# Patient Record
Sex: MALE | Race: WHITE | NOT HISPANIC OR LATINO | Employment: UNEMPLOYED | RURAL
[De-identification: names, ages, dates, MRNs, and addresses within clinical notes are randomized per-mention and may not be internally consistent; named-entity substitution may affect disease eponyms.]

---

## 2021-10-26 ENCOUNTER — HOSPITAL ENCOUNTER (EMERGENCY)
Facility: HOSPITAL | Age: 61
Discharge: HOME | End: 2021-10-26
Payer: MEDICARE

## 2021-10-26 VITALS
OXYGEN SATURATION: 95 % | SYSTOLIC BLOOD PRESSURE: 153 MMHG | TEMPERATURE: 98 F | WEIGHT: 261 LBS | DIASTOLIC BLOOD PRESSURE: 86 MMHG | RESPIRATION RATE: 18 BRPM | HEART RATE: 76 BPM | HEIGHT: 68 IN | BODY MASS INDEX: 39.56 KG/M2

## 2021-10-26 DIAGNOSIS — S39.011A ABDOMINAL WALL STRAIN, INITIAL ENCOUNTER: Primary | ICD-10-CM

## 2021-10-26 DIAGNOSIS — R10.9 ABDOMINAL PAIN: ICD-10-CM

## 2021-10-26 LAB
ALBUMIN SERPL BCP-MCNC: 3.8 G/DL (ref 3.5–5)
ALBUMIN/GLOB SERPL: 0.9 {RATIO}
ALP SERPL-CCNC: 64 U/L (ref 45–115)
ALT SERPL W P-5'-P-CCNC: 90 U/L (ref 16–61)
ANION GAP SERPL CALCULATED.3IONS-SCNC: 12 MMOL/L (ref 7–16)
AST SERPL W P-5'-P-CCNC: 57 U/L (ref 15–37)
BASOPHILS # BLD AUTO: 0.1 K/UL (ref 0–0.2)
BASOPHILS NFR BLD AUTO: 1.1 % (ref 0–1)
BILIRUB SERPL-MCNC: 0.5 MG/DL (ref 0–1.2)
BILIRUB UR QL STRIP: NEGATIVE
BUN SERPL-MCNC: 14 MG/DL (ref 7–18)
BUN/CREAT SERPL: 14 (ref 6–20)
CALCIUM SERPL-MCNC: 8.6 MG/DL (ref 8.5–10.1)
CHLORIDE SERPL-SCNC: 102 MMOL/L (ref 98–107)
CLARITY UR: CLEAR
CO2 SERPL-SCNC: 29 MMOL/L (ref 21–32)
COLOR UR: ABNORMAL
CREAT SERPL-MCNC: 1.03 MG/DL (ref 0.7–1.3)
DIFFERENTIAL METHOD BLD: ABNORMAL
EOSINOPHIL # BLD AUTO: 0.4 K/UL (ref 0–0.5)
EOSINOPHIL NFR BLD AUTO: 4.4 % (ref 1–4)
ERYTHROCYTE [DISTWIDTH] IN BLOOD BY AUTOMATED COUNT: 12.7 % (ref 11.5–14.5)
GLOBULIN SER-MCNC: 4.1 G/DL (ref 2–4)
GLUCOSE SERPL-MCNC: 113 MG/DL (ref 74–106)
GLUCOSE UR STRIP-MCNC: NEGATIVE MG/DL
HCT VFR BLD AUTO: 43.3 % (ref 40–54)
HGB BLD-MCNC: 15.2 G/DL (ref 13.5–18)
IMM GRANULOCYTES # BLD AUTO: 0.03 K/UL (ref 0–0.04)
IMM GRANULOCYTES NFR BLD: 0.3 % (ref 0–0.4)
KETONES UR STRIP-SCNC: NEGATIVE MG/DL
LEUKOCYTE ESTERASE UR QL STRIP: NEGATIVE
LYMPHOCYTES # BLD AUTO: 3.57 K/UL (ref 1–4.8)
LYMPHOCYTES NFR BLD AUTO: 38.8 % (ref 27–41)
MCH RBC QN AUTO: 32.1 PG (ref 27–31)
MCHC RBC AUTO-ENTMCNC: 35.1 G/DL (ref 32–36)
MCV RBC AUTO: 91.4 FL (ref 80–96)
MONOCYTES # BLD AUTO: 0.73 K/UL (ref 0–0.8)
MONOCYTES NFR BLD AUTO: 7.9 % (ref 2–6)
MPC BLD CALC-MCNC: 10.9 FL (ref 9.4–12.4)
NEUTROPHILS # BLD AUTO: 4.36 K/UL (ref 1.8–7.7)
NEUTROPHILS NFR BLD AUTO: 47.5 % (ref 53–65)
NITRITE UR QL STRIP: NEGATIVE
NRBC # BLD AUTO: 0 X10E3/UL
NRBC, AUTO (.00): 0 %
PH UR STRIP: 6 PH UNITS
PLATELET # BLD AUTO: 153 K/UL (ref 150–400)
POTASSIUM SERPL-SCNC: 3.7 MMOL/L (ref 3.5–5.1)
PROT SERPL-MCNC: 7.9 G/DL (ref 6.4–8.2)
PROT UR QL STRIP: NEGATIVE
RBC # BLD AUTO: 4.74 M/UL (ref 4.6–6.2)
RBC # UR STRIP: NEGATIVE /UL
SODIUM SERPL-SCNC: 139 MMOL/L (ref 136–145)
SP GR UR STRIP: >=1.03
UROBILINOGEN UR STRIP-ACNC: 1 MG/DL
WBC # BLD AUTO: 9.19 K/UL (ref 4.5–11)

## 2021-10-26 PROCEDURE — 99283 PR EMERGENCY DEPT VISIT,LEVEL III: ICD-10-PCS | Mod: ,,, | Performed by: NURSE PRACTITIONER

## 2021-10-26 PROCEDURE — 99283 EMERGENCY DEPT VISIT LOW MDM: CPT | Mod: ,,, | Performed by: NURSE PRACTITIONER

## 2021-10-26 PROCEDURE — 84450 TRANSFERASE (AST) (SGOT): CPT | Performed by: NURSE PRACTITIONER

## 2021-10-26 PROCEDURE — 85025 COMPLETE CBC W/AUTO DIFF WBC: CPT | Performed by: NURSE PRACTITIONER

## 2021-10-26 PROCEDURE — 84075 ASSAY ALKALINE PHOSPHATASE: CPT | Performed by: NURSE PRACTITIONER

## 2021-10-26 PROCEDURE — 81003 URINALYSIS AUTO W/O SCOPE: CPT | Performed by: NURSE PRACTITIONER

## 2021-10-26 PROCEDURE — 99285 EMERGENCY DEPT VISIT HI MDM: CPT | Mod: 25

## 2021-10-26 PROCEDURE — 25500020 PHARM REV CODE 255: Performed by: NURSE PRACTITIONER

## 2021-10-26 PROCEDURE — 80053 COMPREHEN METABOLIC PANEL: CPT | Performed by: NURSE PRACTITIONER

## 2021-10-26 PROCEDURE — 36415 COLL VENOUS BLD VENIPUNCTURE: CPT | Performed by: NURSE PRACTITIONER

## 2021-10-26 RX ORDER — GABAPENTIN 300 MG/1
300 CAPSULE ORAL 3 TIMES DAILY
COMMUNITY

## 2021-10-26 RX ORDER — IBUPROFEN 800 MG/1
800 TABLET ORAL 3 TIMES DAILY
Qty: 20 TABLET | Refills: 0 | Status: SHIPPED | OUTPATIENT
Start: 2021-10-26

## 2021-10-26 RX ORDER — HYDROCODONE BITARTRATE AND ACETAMINOPHEN 10; 325 MG/1; MG/1
1 TABLET ORAL EVERY 8 HOURS PRN
COMMUNITY

## 2021-10-26 RX ADMIN — IOPAMIDOL 100 ML: 755 INJECTION, SOLUTION INTRAVENOUS at 09:10

## 2023-12-06 ENCOUNTER — OFFICE VISIT (OUTPATIENT)
Dept: CARDIOLOGY | Facility: CLINIC | Age: 63
End: 2023-12-06
Payer: MEDICARE

## 2023-12-06 VITALS — WEIGHT: 254 LBS | HEIGHT: 68 IN | BODY MASS INDEX: 38.49 KG/M2

## 2023-12-06 DIAGNOSIS — R01.1 NEWLY RECOGNIZED HEART MURMUR: ICD-10-CM

## 2023-12-06 DIAGNOSIS — R06.02 SOB (SHORTNESS OF BREATH): Primary | ICD-10-CM

## 2023-12-06 DIAGNOSIS — I10 ESSENTIAL (PRIMARY) HYPERTENSION: ICD-10-CM

## 2023-12-06 DIAGNOSIS — R01.1 UNDIAGNOSED CARDIAC MURMURS: ICD-10-CM

## 2023-12-06 DIAGNOSIS — I25.118 CORONARY ARTERY DISEASE WITH EXERTIONAL ANGINA: ICD-10-CM

## 2023-12-06 PROCEDURE — 93005 ELECTROCARDIOGRAM TRACING: CPT | Mod: PBBFAC | Performed by: HOSPITALIST

## 2023-12-06 PROCEDURE — 99213 OFFICE O/P EST LOW 20 MIN: CPT | Mod: PBBFAC | Performed by: HOSPITALIST

## 2023-12-06 PROCEDURE — 93010 EKG 12-LEAD: ICD-10-PCS | Mod: S$PBB,,, | Performed by: HOSPITALIST

## 2023-12-06 PROCEDURE — 93010 ELECTROCARDIOGRAM REPORT: CPT | Mod: S$PBB,,, | Performed by: HOSPITALIST

## 2023-12-06 RX ORDER — GABAPENTIN 600 MG/1
600 TABLET ORAL 3 TIMES DAILY
COMMUNITY
Start: 2023-11-24

## 2023-12-06 RX ORDER — TIZANIDINE 4 MG/1
4 TABLET ORAL EVERY 8 HOURS
COMMUNITY
Start: 2023-11-24

## 2023-12-06 RX ORDER — ATORVASTATIN CALCIUM 40 MG/1
40 TABLET, FILM COATED ORAL DAILY
Qty: 30 TABLET | Refills: 5 | Status: SHIPPED | OUTPATIENT
Start: 2023-12-06

## 2023-12-06 RX ORDER — LISINOPRIL AND HYDROCHLOROTHIAZIDE 12.5; 2 MG/1; MG/1
1 TABLET ORAL DAILY
COMMUNITY
Start: 2023-11-21

## 2023-12-06 NOTE — PROGRESS NOTES
"Cardiology Clinic    Referring Provider: Dr. Easley    Subjective:   James Robert is a 63 y.o. male with hx of HTN (recently started on lisinopril/HCTZ by referring), CAD s/p MI at 24yo (no records, patient unsure of details) who presents for eval for as referral from primary for exertional angina and newly recognized murmur.    With regards to angina, patient states he gets winded when walking up inclines and sometimes has to stop to catch his breath. He endorses chest heaviness that is vague, but no overt pain. This has been going on for months, but recently has increased to some degree. It typically resolves within a few minutes of rest.    His murmur was not mentioned in the referral document I saw, but was mentioned in his chart. See physical exam, assessment/plan.     His BP is reasonably well controlled morning despite not taking his anti-htn combo pill this AM. Given such a recent initiation (and seemingly good response), will plan to not uptitrate just yet.     Lastly, given his MI at 24yo, typically would expect a high-intensity statin, but see none on his medication list. See a/p.        Fhx: no family hx of early CAD, somewhat non-contributory given the patient's MI at 24yo  Shx: dips chewing tobacco, works outside mowing grass and doing landscaping    EKG - NSR, non-specific ST-T     ECHO - No results found for this or any previous visit.      CATH - No results found for this or any previous visit.      Stress - None    Lab Results   Component Value Date     10/26/2021    K 3.7 10/26/2021     10/26/2021    CO2 29 10/26/2021    BUN 14 10/26/2021    CREATININE 1.03 10/26/2021    CALCIUM 8.6 10/26/2021    ANIONGAP 12 10/26/2021    EGFRNONAA 78 10/26/2021       No results found for: "CHOL"  No results found for: "HDL"  No results found for: "LDLCALC"  No results found for: "TRIG"  No results found for: "CHOLHDL"    Lab Results   Component Value Date    WBC 9.19 10/26/2021    HGB 15.2 " "10/26/2021    HCT 43.3 10/26/2021    MCV 91.4 10/26/2021     10/26/2021           Current Outpatient Medications:     gabapentin (NEURONTIN) 600 MG tablet, Take 600 mg by mouth 3 (three) times daily., Disp: , Rfl:     HYDROcodone-acetaminophen (NORCO)  mg per tablet, Take 1 tablet by mouth every 8 (eight) hours as needed for Pain., Disp: , Rfl:     ibuprofen (ADVIL,MOTRIN) 800 MG tablet, Take 1 tablet (800 mg total) by mouth 3 (three) times daily., Disp: 20 tablet, Rfl: 0    lisinopriL-hydrochlorothiazide (PRINZIDE,ZESTORETIC) 20-12.5 mg per tablet, Take 1 tablet by mouth once daily., Disp: , Rfl:     tiZANidine (ZANAFLEX) 4 MG tablet, Take 4 mg by mouth every 8 (eight) hours., Disp: , Rfl:     gabapentin (NEURONTIN) 300 MG capsule, Take 300 mg by mouth 3 (three) times daily., Disp: , Rfl:     12-pt Review of System negative aside from as mentioned in HPI, below, and A/P      Objective:   Ht 5' 8" (1.727 m)   Wt 115.2 kg (254 lb)   BMI 38.62 kg/m²       Physical Exam:  Gen:NAD  HEENT: NC,AT  Chest: normal respiratory effort  CV: RRR, 3/6 blowing holosytolic murmur best at the L sternal border  Abd: flat  Ext: no edema  Skin: no visible rash  Neuro: CNGI  Psych: AMAA  : not assessed        Assessment:     Exertional angina in context of CAD w/ MI at very young age  HLD in context of Prior MI - high-intensity statin indicated for secondary prevention  Murmur- newly recognized murmur (patient never told prior),   HTN        Plan:   Exertional angina in context of known CAD and MI at very young age   -further ischemic eval via pharm nuclear stress test given patient cannot walk that well due to L knee/foot issue  2. HLD - empirically start lipitor 40mg daily, check fasting lipid panel  3. Murmur possibly indicative of pathology given that it is holosystolic and does not sound like typical (mild-moderate) AS  -TTE to further evaluate for SHD  4. HTN - continue lisinopril 20/HCTZ 12.5mg PO daily; should " start e.g. coreg 3.125 mg PO BID either from Dr. Easley or at f/u given hx of MI  5. tobacco dependence- patient dips, did not express interest in quitting, though would help with BP control - defer to primary

## 2023-12-26 ENCOUNTER — HOSPITAL ENCOUNTER (OUTPATIENT)
Dept: CARDIOLOGY | Facility: HOSPITAL | Age: 63
Discharge: HOME | End: 2023-12-26
Attending: HOSPITALIST
Payer: MEDICARE

## 2023-12-26 ENCOUNTER — HOSPITAL ENCOUNTER (OUTPATIENT)
Dept: RADIOLOGY | Facility: HOSPITAL | Age: 63
Discharge: HOME | End: 2023-12-26
Attending: HOSPITALIST
Payer: MEDICARE

## 2023-12-26 VITALS — BODY MASS INDEX: 42.32 KG/M2 | HEIGHT: 65 IN | WEIGHT: 254 LBS

## 2023-12-26 DIAGNOSIS — I25.118 CORONARY ARTERY DISEASE WITH EXERTIONAL ANGINA: ICD-10-CM

## 2023-12-26 DIAGNOSIS — R01.1 UNDIAGNOSED CARDIAC MURMURS: ICD-10-CM

## 2023-12-26 DIAGNOSIS — R01.1 NEWLY RECOGNIZED HEART MURMUR: ICD-10-CM

## 2023-12-26 DIAGNOSIS — I10 ESSENTIAL (PRIMARY) HYPERTENSION: ICD-10-CM

## 2023-12-26 DIAGNOSIS — R06.02 SOB (SHORTNESS OF BREATH): ICD-10-CM

## 2023-12-26 PROCEDURE — 78452 HT MUSCLE IMAGE SPECT MULT: CPT | Mod: 26,,, | Performed by: HOSPITALIST

## 2023-12-26 PROCEDURE — 93018 CV STRESS TEST I&R ONLY: CPT | Mod: ,,, | Performed by: HOSPITALIST

## 2023-12-26 PROCEDURE — 93016 CV STRESS TEST SUPVJ ONLY: CPT | Mod: ,,, | Performed by: NURSE PRACTITIONER

## 2023-12-26 PROCEDURE — 93306 TTE W/DOPPLER COMPLETE: CPT

## 2023-12-26 PROCEDURE — 93306 ECHO (CUPID ONLY): ICD-10-PCS | Mod: 26,,, | Performed by: HOSPITALIST

## 2023-12-26 PROCEDURE — A9500 TC99M SESTAMIBI: HCPCS

## 2023-12-26 PROCEDURE — 93017 CV STRESS TEST TRACING ONLY: CPT

## 2023-12-26 PROCEDURE — 93016 NUCLEAR STRESS TEST (CUPID ONLY): ICD-10-PCS | Mod: ,,, | Performed by: NURSE PRACTITIONER

## 2023-12-26 PROCEDURE — 93018 NUCLEAR STRESS TEST (CUPID ONLY): ICD-10-PCS | Mod: ,,, | Performed by: HOSPITALIST

## 2023-12-26 PROCEDURE — 93306 TTE W/DOPPLER COMPLETE: CPT | Mod: 26,,, | Performed by: HOSPITALIST

## 2023-12-26 PROCEDURE — 63600175 PHARM REV CODE 636 W HCPCS: Performed by: HOSPITALIST

## 2023-12-26 PROCEDURE — 78452 NM MYOCARDIAL PERFUSION SPECT MULTI PHARM: ICD-10-PCS | Mod: 26,,, | Performed by: HOSPITALIST

## 2023-12-26 RX ORDER — REGADENOSON 0.08 MG/ML
0.4 INJECTION, SOLUTION INTRAVENOUS ONCE
Status: COMPLETED | OUTPATIENT
Start: 2023-12-26 | End: 2023-12-26

## 2023-12-26 RX ADMIN — REGADENOSON 0.4 MG: 0.08 INJECTION, SOLUTION INTRAVENOUS at 09:12

## 2024-01-01 LAB
AORTIC ROOT ANNULUS: 3.54 CM
AORTIC VALVE CUSP SEPERATION: 1.26 CM
AV INDEX (PROSTH): 0.57
AV MEAN GRADIENT: 7 MMHG
AV PEAK GRADIENT: 14 MMHG
AV REGURGITATION PRESSURE HALF TIME: 935.51 MS
AV VALVE AREA BY VELOCITY RATIO: 1.65 CM²
AV VALVE AREA: 1.86 CM²
AV VELOCITY RATIO: 0.51
BSA FOR ECHO PROCEDURE: 2.3 M2
CV ECHO LV RWT: 0.27 CM
CV STRESS BASE HR: 56 BPM
DIASTOLIC BLOOD PRESSURE: 81 MMHG
DOP CALC AO PEAK VEL: 1.84 M/S
DOP CALC AO VTI: 41.5 CM
DOP CALC LVOT AREA: 3.2 CM2
DOP CALC LVOT DIAMETER: 2.03 CM
DOP CALC LVOT PEAK VEL: 0.94 M/S
DOP CALC LVOT STROKE VOLUME: 76.99 CM3
DOP CALCLVOT PEAK VEL VTI: 23.8 CM
E WAVE DECELERATION TIME: 324.34 MSEC
E/A RATIO: 1.03
E/E' RATIO: 11.18 M/S
ECHO LV POSTERIOR WALL: 0.78 CM (ref 0.6–1.1)
FRACTIONAL SHORTENING: 31 % (ref 28–44)
INTERVENTRICULAR SEPTUM: 1.16 CM (ref 0.6–1.1)
IVC DIAMETER: 1.23 CM
LEFT INTERNAL DIMENSION IN SYSTOLE: 3.98 CM (ref 2.1–4)
LEFT VENTRICLE DIASTOLIC VOLUME INDEX: 75.13 ML/M2
LEFT VENTRICLE DIASTOLIC VOLUME: 164.53 ML
LEFT VENTRICLE MASS INDEX: 101 G/M2
LEFT VENTRICLE SYSTOLIC VOLUME INDEX: 31.6 ML/M2
LEFT VENTRICLE SYSTOLIC VOLUME: 69.23 ML
LEFT VENTRICULAR INTERNAL DIMENSION IN DIASTOLE: 5.77 CM (ref 3.5–6)
LEFT VENTRICULAR MASS: 222.09 G
LV LATERAL E/E' RATIO: 8.64 M/S
LV SEPTAL E/E' RATIO: 15.83 M/S
LVOT MG: 2.35 MMHG
LVOT MV: 0.74 CM/S
MV PEAK A VEL: 0.92 M/S
MV PEAK E VEL: 0.95 M/S
OHS CV CPX 1 MINUTE RECOVERY HEART RATE: 82 BPM
OHS CV CPX 85 PERCENT MAX PREDICTED HEART RATE MALE: 133
OHS CV CPX MAX PREDICTED HEART RATE: 157
OHS CV CPX PATIENT IS FEMALE: 0
OHS CV CPX PATIENT IS MALE: 1
OHS CV CPX PEAK DIASTOLIC BLOOD PRESSURE: 74 MMHG
OHS CV CPX PEAK HEAR RATE: 90 BPM
OHS CV CPX PEAK RATE PRESSURE PRODUCT: NORMAL
OHS CV CPX PEAK SYSTOLIC BLOOD PRESSURE: 130 MMHG
OHS CV CPX PERCENT MAX PREDICTED HEART RATE ACHIEVED: 57
OHS CV CPX RATE PRESSURE PRODUCT PRESENTING: 7840
PISA AR MAX VEL: 3.95 M/S
PV PEAK GRADIENT: 8 MMHG
PV PEAK VELOCITY: 1.37 M/S
RIGHT ATRIAL AREA: 16 CM2
RIGHT VENTRICULAR END-DIASTOLIC DIMENSION: 3.4 CM
RIGHT VENTRICULAR LENGTH IN DIASTOLE (APICAL 4-CHAMBER VIEW): 7.01 CM
RV MID DIAMA: 3.14 CM
SYSTOLIC BLOOD PRESSURE: 140 MMHG
TDI LATERAL: 0.11 M/S
TDI SEPTAL: 0.06 M/S
TDI: 0.09 M/S
TRICUSPID ANNULAR PLANE SYSTOLIC EXCURSION: 2.77 CM
Z-SCORE OF LEFT VENTRICULAR DIMENSION IN END DIASTOLE: -2.49
Z-SCORE OF LEFT VENTRICULAR DIMENSION IN END SYSTOLE: -0.97

## 2024-02-26 LAB
OHS QRS DURATION: 96 MS
OHS QTC CALCULATION: 437 MS

## 2024-03-21 ENCOUNTER — TELEPHONE (OUTPATIENT)
Dept: CARDIOLOGY | Facility: CLINIC | Age: 64
End: 2024-03-21
Payer: MEDICARE

## 2024-03-21 NOTE — TELEPHONE ENCOUNTER
Spoke with pt about test results and I informed pt that Dr. James was wanting to schedule a heart cath. Pt missed his appointment on 1/8/24. Pt wanted to be seen next month (April) but I informed him that he may need to be sooner. Pt agreed to be seen on 3/26/24 at 9:20.

## 2024-08-16 ENCOUNTER — HOSPITAL ENCOUNTER (EMERGENCY)
Facility: HOSPITAL | Age: 64
Discharge: HOME | End: 2024-08-16
Attending: SPECIALIST
Payer: MEDICARE

## 2024-08-16 VITALS
TEMPERATURE: 97 F | RESPIRATION RATE: 16 BRPM | WEIGHT: 256.81 LBS | HEART RATE: 60 BPM | HEIGHT: 68 IN | BODY MASS INDEX: 38.92 KG/M2 | OXYGEN SATURATION: 98 % | DIASTOLIC BLOOD PRESSURE: 87 MMHG | SYSTOLIC BLOOD PRESSURE: 156 MMHG

## 2024-08-16 DIAGNOSIS — U07.1 COVID-19 VIRUS DETECTED: ICD-10-CM

## 2024-08-16 DIAGNOSIS — U07.1 COVID-19: ICD-10-CM

## 2024-08-16 DIAGNOSIS — J01.80 OTHER ACUTE SINUSITIS, RECURRENCE NOT SPECIFIED: ICD-10-CM

## 2024-08-16 DIAGNOSIS — R09.81 NASAL CONGESTION: Primary | ICD-10-CM

## 2024-08-16 LAB — SARS-COV-2 RDRP RESP QL NAA+PROBE: POSITIVE

## 2024-08-16 PROCEDURE — 99283 EMERGENCY DEPT VISIT LOW MDM: CPT

## 2024-08-16 PROCEDURE — 87635 SARS-COV-2 COVID-19 AMP PRB: CPT | Performed by: SPECIALIST

## 2024-08-16 PROCEDURE — 99284 EMERGENCY DEPT VISIT MOD MDM: CPT | Performed by: SPECIALIST

## 2024-08-16 NOTE — ED PROVIDER NOTES
Encounter Date: 8/16/2024       History     Chief Complaint   Patient presents with    Nasal Congestion    Headache    Sinusitis     Patient is a 65 yo wm who has been working outside cutting grass and states that he has had a headache in the forehead, nasal drainage, sneezing but denies any cough or shortness of breath. Denies f/c/n/v/d.       Review of patient's allergies indicates:   Allergen Reactions    Aspirin Rash    Pcn [penicillins] Rash     Past Medical History:   Diagnosis Date    Chronic instability of knee, unspecified knee     nerve damage  left    Chronic pain     Goes to [facility] Dr. Lacey    Hypertension     Low back pain due to displacement of intervertebral disc     disc 4     Past Surgical History:   Procedure Laterality Date    KNEE SURGERY Left      Family History   Problem Relation Name Age of Onset    Hypertension Mother      Heart disease Mother      Diabetes Mother      Hypertension Father      Heart attack Father          cause of death    Diabetes Father      Heart disease Brother       Social History     Tobacco Use    Smoking status: Never    Smokeless tobacco: Current     Types: Snuff   Substance Use Topics    Alcohol use: Never    Drug use: Never     Review of Systems   Constitutional:  Positive for fatigue. Negative for activity change, appetite change and chills.   HENT:  Positive for congestion, postnasal drip, rhinorrhea, sinus pressure, sinus pain and sneezing.    Eyes: Negative.    Respiratory: Negative.     Cardiovascular: Negative.    Gastrointestinal: Negative.    Endocrine: Negative.    Genitourinary: Negative.    Musculoskeletal: Negative.    Skin: Negative.    Allergic/Immunologic: Positive for environmental allergies.   Neurological: Negative.    Hematological: Negative.    Psychiatric/Behavioral: Negative.     All other systems reviewed and are negative.      Physical Exam     Initial Vitals [08/16/24 1141]   BP Pulse Resp Temp SpO2   (!) 156/87 60 16 97.3  °F (36.3 °C) 98 %      MAP       --         Physical Exam    Nursing note and vitals reviewed.  Constitutional: He appears well-developed and well-nourished. No distress.   HENT:   Head: Normocephalic and atraumatic.   Right Ear: External ear normal.   Left Ear: External ear normal.   Mouth/Throat: Oropharynx is clear and moist.   Congested nares with swollen turbinates   Eyes: Conjunctivae are normal. Pupils are equal, round, and reactive to light. Right eye exhibits no discharge. Left eye exhibits no discharge. No scleral icterus.   Neck: Neck supple.   Normal range of motion.  Cardiovascular:  Normal rate, regular rhythm and normal heart sounds.           Pulmonary/Chest: Breath sounds normal. No respiratory distress.   Abdominal: Abdomen is soft.   Musculoskeletal:         General: No tenderness or edema. Normal range of motion.      Cervical back: Normal range of motion and neck supple.     Neurological: He is alert and oriented to person, place, and time. He has normal strength. GCS score is 15. GCS eye subscore is 4. GCS verbal subscore is 5. GCS motor subscore is 6.   Skin: Skin is warm.   Psychiatric: He has a normal mood and affect. His behavior is normal. Judgment and thought content normal.         Medical Screening Exam   See Full Note    ED Course   Procedures  Labs Reviewed   SARS-COV-2 RNA AMPLIFICATION, QUAL - Abnormal       Result Value    SARS COV-2 Molecular Positive (*)           Imaging Results    None          Medications - No data to display  Medical Decision Making  63 yo wm with seasonal type allergies from outside work    Amount and/or Complexity of Data Reviewed  Labs:  Decision-making details documented in ED Course.    Risk  OTC drugs.  Prescription drug management.  Risk Details: Patient without any serious illness. History of seasonal allergies with HA in frontal sinuses.                                        Clinical Impression:   Final diagnoses:  [R09.81] Nasal congestion  (Primary)  [J01.80] Other acute sinusitis, recurrence not specified  [U07.1] COVID-19        ED Disposition Condition    Discharge Stable          ED Prescriptions       Medication Sig Dispense Start Date End Date Auth. Provider    molnupiravir 200 mg capsule (EUA) Take 4 capsules (800 mg total) by mouth every 12 (twelve) hours. for 5 days 40 capsule 8/16/2024 8/21/2024 Katy Alicea MD          Follow-up Information    None          Katy Alicea MD  08/16/24 9544

## 2024-08-16 NOTE — ED TRIAGE NOTES
Pt to ER with c/o cold symptoms and nasal drainage and congestion with a headache states onset 1 week. Pt states his pain is a 4 on 0-10 scale. Pt states he has been outside cutting grass may be allergies

## 2024-09-04 ENCOUNTER — HOSPITAL ENCOUNTER (EMERGENCY)
Facility: HOSPITAL | Age: 64
Discharge: HOME | End: 2024-09-04
Attending: INTERNAL MEDICINE
Payer: MEDICARE

## 2024-09-04 VITALS
BODY MASS INDEX: 38.8 KG/M2 | RESPIRATION RATE: 18 BRPM | SYSTOLIC BLOOD PRESSURE: 144 MMHG | TEMPERATURE: 98 F | DIASTOLIC BLOOD PRESSURE: 82 MMHG | WEIGHT: 256 LBS | HEART RATE: 66 BPM | HEIGHT: 68 IN | OXYGEN SATURATION: 96 %

## 2024-09-04 DIAGNOSIS — M54.2 NECK PAIN: ICD-10-CM

## 2024-09-04 DIAGNOSIS — I10 HYPERTENSION: ICD-10-CM

## 2024-09-04 DIAGNOSIS — J02.9 VIRAL PHARYNGITIS: Primary | ICD-10-CM

## 2024-09-04 LAB
GROUP A STREP MOLECULAR (OHS): NEGATIVE
OHS QRS DURATION: 102 MS
OHS QTC CALCULATION: 419 MS

## 2024-09-04 PROCEDURE — 99284 EMERGENCY DEPT VISIT MOD MDM: CPT | Mod: 25

## 2024-09-04 PROCEDURE — 87651 STREP A DNA AMP PROBE: CPT | Performed by: INTERNAL MEDICINE

## 2024-09-04 PROCEDURE — 93005 ELECTROCARDIOGRAM TRACING: CPT

## 2024-09-04 RX ORDER — AZITHROMYCIN 250 MG/1
250 TABLET, FILM COATED ORAL DAILY
Qty: 6 TABLET | Refills: 0 | Status: SHIPPED | OUTPATIENT
Start: 2024-09-04

## 2024-09-04 RX ORDER — DEXAMETHASONE SODIUM PHOSPHATE 4 MG/ML
4 INJECTION, SOLUTION INTRA-ARTICULAR; INTRALESIONAL; INTRAMUSCULAR; INTRAVENOUS; SOFT TISSUE
Status: DISCONTINUED | OUTPATIENT
Start: 2024-09-04 | End: 2024-09-04

## 2024-09-04 NOTE — ED NOTES
Pt refused injection of steroids stating that he has anew bottle of steroids at home that he was given last week and he would rather take his own, Dr Mack notified and order for medication  discontinued, medication wasted

## 2024-09-04 NOTE — ED NOTES
"DR LORENZANA NOTIFIED PT REFUSED DECADRON INJECTION STATES "I HAVE STEROIDS AT HOME I WILL TAKE THOSE"  "

## 2024-09-04 NOTE — ED PROVIDER NOTES
Encounter Date: 9/4/2024       History     Chief Complaint   Patient presents with    Sore Throat     Patient comes in complaining of sore throat of the right side with a not on neck for the last 4 days.  No difficulty breathing but it does hurt to swallow.  There was no fever, nausea, vomiting, headache, neurological deficits, chest pain or shortness breath.  Patient was has no medical problems and takes no medications he has no primary care provider.  He was followed by the [facility], Dr. Lacey for chronic low back pain.  Patient was seen here 2 weeks ago diagnosed with COVID, has not follow with any primary care provider.    CARDIOLOGY HISTORY:   Spoke with pt about test results and I informed pt that Dr. James was wanting to schedule a heart cath. Pt missed his appointment on 1/8/24. Pt wanted to be seen next month (April) but I informed him that he may need to be sooner. Pt agreed to be seen on 3/26/24 at 9:20.    Left Ventricle: The left ventricle is mildly dilated. Mildly increased wall thickness. There is mild asymmetric hypertrophy. Mild global hypokinesis present. There is low normal systolic function with a visually estimated ejection fraction of 50 - 55%.  ·  Right Ventricle: Mild right ventricular enlargement. Systolic function is normal.  ·  Aortic Valve: There is moderate aortic valve sclerosis. Mildly restricted motion - specifically RCC leaflet appears fixed but valve assessment overall limited by poor visualization 2/2 moderate severe calcification. There is mild aortic regurgitation.  ·  Mitral Valve: There is mild regurgitation.      Scintigraphically equivocal, however t.i.d. greater than 1.2 consistent with ischemia.  Fixed perfusion defect showing backwards reversibility on stress may represent balanced ischemia in this individual with a history of prior MI at 23 years old recommend left heart catheterization and coronary angiogram for further ischemic evaluation.  2. the global  left ventricular systolic function is low normal with an LV ejection fraction of 50 % and evidence of significant TID is described above.  Wall motion is abnormal in the segments corresponding to the LAD and LCX territories.             Review of patient's allergies indicates:   Allergen Reactions    Aspirin Rash    Pcn [penicillins] Rash     Past Medical History:   Diagnosis Date    Chronic instability of knee, unspecified knee     nerve damage  left    Chronic pain     Goes to [facility] Dr. Lacey    Hypertension     Low back pain due to displacement of intervertebral disc     disc 4     Past Surgical History:   Procedure Laterality Date    KNEE SURGERY Left      Family History   Problem Relation Name Age of Onset    Hypertension Mother      Heart disease Mother      Diabetes Mother      Hypertension Father      Heart attack Father          cause of death    Diabetes Father      Heart disease Brother       Social History     Tobacco Use    Smoking status: Never    Smokeless tobacco: Current     Types: Snuff   Substance Use Topics    Alcohol use: Never    Drug use: Never     Review of Systems   Constitutional:  Negative for fever.   HENT:  Negative for sore throat.    Respiratory:  Negative for shortness of breath.    Cardiovascular:  Negative for chest pain.   Gastrointestinal:  Negative for nausea.   Genitourinary:  Negative for dysuria.   Musculoskeletal:  Negative for back pain.   Skin:  Negative for rash.   Neurological:  Negative for weakness.   Hematological:  Does not bruise/bleed easily.       Physical Exam     Initial Vitals [09/04/24 0535]   BP Pulse Resp Temp SpO2   (!) 150/89 65 18 98.3 °F (36.8 °C) 97 %      MAP       --         Physical Exam    HENT:   Right Ear: Tympanic membrane normal.   Left Ear: Tympanic membrane normal.   Nose: Nose normal.   Mouth/Throat: Uvula is midline, oropharynx is clear and moist and mucous membranes are normal.   Neck: Neck supple. No stridor present. No  tracheal tenderness present. No tracheal deviation present.       Tenderness in the into cervical no but no evidence any mass effect swelling.   Normal range of motion.   Full passive range of motion without pain.     Cardiovascular:  Regular rhythm, normal heart sounds and normal pulses.           Pulmonary/Chest: Effort normal and breath sounds normal.   Abdominal: Abdomen is flat. Bowel sounds are normal.   Musculoskeletal:      Cervical back: Full passive range of motion without pain, normal range of motion and neck supple. No rigidity. No spinous process tenderness.     Neurological: He is alert. He has normal strength and normal reflexes. No cranial nerve deficit.         Medical Screening Exam   See Full Note    ED Course   Procedures  Labs Reviewed   STREP A BY MOLECULAR METHOD - Normal       Result Value    Group A Strep Molecular Negative       EKG Readings: (Independently Interpreted)   Initial Reading: No STEMI. Previous EKG: Compared with most recent EKG Previous EKG Date: 12/06/2023. Rhythm: Normal Sinus Rhythm. Heart Rate: 55. Ectopy: No Ectopy. T Waves: Normal. Clinical Impression: Sinus Bradycardia Other Impression: Incomplete right bundle-branch block   Sinus bradycardia with no acute ischemic changes       Imaging Results              X-Ray Chest PA And Lateral (In process)                   X-Rays:   Independently Interpreted Readings:   Other Readings:  Chest x-ray shows no acute cardiomegaly, congestive heart failure or infiltrates.    Medications - No data to display    Medical Decision Making  Patient with sore throat and enlarged lymph node rule out strep pharyngitis, laryngitis or cardiac ischemia.    Amount and/or Complexity of Data Reviewed  Labs: ordered. Decision-making details documented in ED Course.  Radiology: ordered.  Discussion of management or test interpretation with external provider(s): Negative, chest x-ray was negative come EKG shows incomplete right bundle-branch block but  no acute ischemic changes.  I discussed with the patient the fact that Cardiology has been trying to reach him to do a heart catheterization.  Actually him if he wanted me to make another referral he said no.  He states he did not want to had heart catheterization but he would think about it and he would called his cardiologist if he chose to do so.  I did advised to follow up with Dr. Pitts said he could have a primary care provider.  Suggested some this is maybe COVID related but will treat him for pharyngitis.  Will give him a shot of Decadron and zpak, f/u with to Dr. Pitts clinic for evaluation by ENT, GI or further workup.    Risk  Prescription drug management.               ED Course as of 09/04/24 0639   Wed Sep 04, 2024   0614 Group A Strep, Molecular: Negative [PW]      ED Course User Index  [PW] Colin Mack MD                           Clinical Impression:   Final diagnoses:  [M54.2] Neck pain  [I10] Hypertension  [J02.9] Viral pharyngitis (Primary)        ED Disposition Condition    Discharge Stable          ED Prescriptions       Medication Sig Dispense Start Date End Date Auth. Provider    azithromycin (Z-DAV) 250 MG tablet Take 1 tablet (250 mg total) by mouth once daily. Take first 2 tablets together, then 1 every day until finished. 6 tablet 9/4/2024 -- Colin Mack MD          Follow-up Information       Follow up With Specialties Details Why Contact Info    Alycia Carrera MD Family Medicine In 2 days  [address] [facility] [place] [address]  [phone]               Colin Mack MD  09/04/24 0620       Colin Mack MD  09/04/24 0639

## 2025-07-05 ENCOUNTER — HOSPITAL ENCOUNTER (EMERGENCY)
Facility: HOSPITAL | Age: 65
Discharge: STILL ADMITTED OR ADMINISTRATIVE | End: 2025-07-05
Attending: INTERNAL MEDICINE
Payer: MEDICARE

## 2025-07-05 VITALS
HEART RATE: 89 BPM | DIASTOLIC BLOOD PRESSURE: 80 MMHG | WEIGHT: 260.81 LBS | HEIGHT: 68 IN | TEMPERATURE: 97 F | RESPIRATION RATE: 16 BRPM | OXYGEN SATURATION: 96 % | BODY MASS INDEX: 39.53 KG/M2 | SYSTOLIC BLOOD PRESSURE: 133 MMHG

## 2025-07-05 DIAGNOSIS — R52 PAIN: ICD-10-CM

## 2025-07-05 DIAGNOSIS — L03.113 RIGHT ARM CELLULITIS: Primary | ICD-10-CM

## 2025-07-05 DIAGNOSIS — R22.31 LOCALIZED SWELLING ON RIGHT HAND: ICD-10-CM

## 2025-07-05 PROCEDURE — 99284 EMERGENCY DEPT VISIT MOD MDM: CPT | Mod: 25

## 2025-07-05 PROCEDURE — 63600175 PHARM REV CODE 636 W HCPCS: Performed by: INTERNAL MEDICINE

## 2025-07-05 PROCEDURE — 96372 THER/PROPH/DIAG INJ SC/IM: CPT | Performed by: INTERNAL MEDICINE

## 2025-07-05 PROCEDURE — 99284 EMERGENCY DEPT VISIT MOD MDM: CPT | Performed by: INTERNAL MEDICINE

## 2025-07-05 RX ORDER — CLINDAMYCIN HYDROCHLORIDE 150 MG/1
150 CAPSULE ORAL EVERY 8 HOURS
Qty: 21 CAPSULE | Refills: 0 | Status: SHIPPED | OUTPATIENT
Start: 2025-07-05 | End: 2025-07-12

## 2025-07-05 RX ORDER — CLINDAMYCIN PHOSPHATE 150 MG/ML
600 INJECTION, SOLUTION INTRAVENOUS
Status: COMPLETED | OUTPATIENT
Start: 2025-07-05 | End: 2025-07-05

## 2025-07-05 RX ADMIN — CLINDAMYCIN PHOSPHATE 600 MG: 150 INJECTION, SOLUTION INTRAMUSCULAR; INTRAVENOUS at 02:07

## 2025-07-05 NOTE — ED PROVIDER NOTES
Encounter Date: 7/5/2025       History     Chief Complaint   Patient presents with    Arm Pain    Arm Swelling     Patient comes in pain and swelling of the right forearm and hand for last 3 days.  States he was outside cutting grass, and he went into some wishes with stickers that is scratched his arm.  Started having some swelling and redness.  Also involves his hand as well.  He denies any headache, neck pain, paralysis paresthesias of the left upper extremities or lower extremities.  Patient denies chest pain shortness breath, exertional angina PND and orthopnea.        Review of patient's allergies indicates:   Allergen Reactions    Aspirin Rash    Pcn [penicillins] Rash     Past Medical History:   Diagnosis Date    Chronic instability of knee, unspecified knee     nerve damage  left    Chronic pain     Goes to [facility] Dr. Lacey    Hypertension     Low back pain due to displacement of intervertebral disc     disc 4     Past Surgical History:   Procedure Laterality Date    KNEE SURGERY Left      Family History   Problem Relation Name Age of Onset    Hypertension Mother      Heart disease Mother      Diabetes Mother      Hypertension Father      Heart attack Father          cause of death    Diabetes Father      Heart disease Brother       Social History[1]  Review of Systems   Constitutional:  Negative for fever.   HENT:  Negative for sore throat.    Respiratory:  Negative for shortness of breath.    Cardiovascular:  Negative for chest pain.   Gastrointestinal:  Negative for nausea.   Genitourinary:  Negative for dysuria.   Musculoskeletal:  Negative for back pain.   Skin:  Negative for rash.   Neurological:  Negative for weakness.   Hematological:  Does not bruise/bleed easily.       Physical Exam     Initial Vitals [07/05/25 1349]   BP Pulse Resp Temp SpO2   133/80 89 16 97.4 °F (36.3 °C) 96 %      MAP       --         Physical Exam    Nursing note and vitals reviewed.  Constitutional: He  appears well-developed.   HENT:   Head: Normocephalic.   Eyes: Pupils are equal, round, and reactive to light.   Neck: Trachea normal. Neck supple.   Normal range of motion.   Full passive range of motion without pain.     Cardiovascular:  Normal rate, regular rhythm, normal heart sounds and normal pulses.           Pulmonary/Chest: Effort normal and breath sounds normal.   Abdominal: Abdomen is soft.   Musculoskeletal:         General: Normal range of motion.      Right forearm: Swelling and tenderness present.      Right hand: Swelling, tenderness and bony tenderness present. Normal sensation. There is no disruption of two-point discrimination. Normal capillary refill. Normal pulse.        Arms:       Cervical back: Full passive range of motion without pain, normal range of motion and neck supple.      Comments: Erythematous changes on the right forearm with some swelling mainly lateral, extend dorsum of the hand.  Bony changes of the fingers rule out fracture dislocation.     Neurological: He is alert. He has normal strength and normal reflexes. No cranial nerve deficit. GCS eye subscore is 4. GCS verbal subscore is 5. GCS motor subscore is 6.   Skin: Skin is warm.         Medical Screening Exam   See Full Note    ED Course   Procedures  Labs Reviewed - No data to display       Imaging Results              X-Ray Hand 3 view Right (In process)                      X-Ray Forearm Right (In process)                   X-Rays:   Independently Interpreted Readings:   Other Readings:  Spoke with the hand shows some arthritic changes no foreign body or fracture dislocation, right arm shows no acute findings    Medications   clindamycin injection 600 mg (600 mg Intramuscular Given 7/5/25 3299)     Medical Decision Making  Patient with swelling erythematous changes possible cellulitis of the right upper extremity rule out bony changes or fracture    Amount and/or Complexity of Data Reviewed  Radiology: ordered.  Discussion  of management or test interpretation with external provider(s): Patient has a cellulitis of his right forearm with some swelling and abrasion consistent with scratches and small puncture wounds.  No evidence any vascular compromise or neuro deficits.  Will treat with clindamycin, warm he had elevation and follow with Dr. Pitts, and referral from that point.    Risk  Prescription drug management.                                      Clinical Impression:   Final diagnoses:  [R52] Pain  [L03.113] Right arm cellulitis (Primary)  [R22.31] Localized swelling on right hand        ED Disposition Condition    Discharge Stable          ED Prescriptions       Medication Sig Dispense Start Date End Date Auth. Provider    clindamycin (CLEOCIN) 150 MG capsule Take 1 capsule (150 mg total) by mouth every 8 (eight) hours. for 7 days 21 capsule 7/5/2025 7/12/2025 Colin Mack MD          Follow-up Information       Follow up With Specialties Details Why Contact Info    Alycia Carrera MD Family Medicine On 7/7/2025  [address] [place] [id number]  [phone]                   [1]   Social History  Tobacco Use    Smoking status: Never    Smokeless tobacco: Current     Types: Snuff   Substance Use Topics    Alcohol use: Never    Drug use: Never        Colin Mack MD  07/05/25 3580

## 2025-07-05 NOTE — ED TRIAGE NOTES
Pt to ER with c/o right arm/hand pain and swelling onset one week. Pt states pain is a 8 on 0-10 scale. Pt states he has numbness and tingling in his right arm.